# Patient Record
Sex: MALE | Race: WHITE | NOT HISPANIC OR LATINO | Employment: OTHER | URBAN - METROPOLITAN AREA
[De-identification: names, ages, dates, MRNs, and addresses within clinical notes are randomized per-mention and may not be internally consistent; named-entity substitution may affect disease eponyms.]

---

## 2022-06-14 NOTE — PROGRESS NOTES
6/15/2022      Chief Complaint   Patient presents with    Flank Pain     Right side          Assessment and Plan    32 y o  male -- New patient    1  Nephrolithiasis  2  Right sided Flank pain  - UA showing blood, negative for infection  - non-obstructing stones seen on US (5/19/22)  - Having flank pain  - Will obtain STAT CT and call with results  - Flomax sent to pharmacy  Increase fluids  - ER precautions of severe pain, fever, chills, nausea, vomiting  - Call with any questions or concerns  - All questions answered; patient understands and agrees with plan        History of Present Illness  Messi Escalera IV is a 32 y o  male new patient here for evaluation of nephrolithiasis  Patient has a history of nephrolithiasis  Patient states he has been having right sided flank pain for the past few hours  Denies recent stone passage  Denies needing surgical intervention in the past for kidney stones  Denies gross hematuria, dysuria, fever, chills, nausea  Patient did vomit 2 times this morning, however, feels better now Patient did have US performed (5/19/22) at an outside facility showing intrarenal, non-obstructing stones  Denies seeing urology in the past      Review of Systems   Constitutional: Negative for activity change, appetite change, chills and fever  HENT: Negative for congestion and trouble swallowing  Respiratory: Negative for cough and shortness of breath  Cardiovascular: Negative for chest pain, palpitations and leg swelling  Gastrointestinal: Negative for abdominal pain, constipation, diarrhea, nausea and vomiting  Genitourinary: Positive for flank pain  Negative for difficulty urinating, dysuria, frequency, hematuria and urgency  Musculoskeletal: Negative for back pain and gait problem  Skin: Negative for wound  Allergic/Immunologic: Negative for immunocompromised state  Neurological: Negative for dizziness and syncope  Hematological: Does not bruise/bleed easily  Psychiatric/Behavioral: Negative for confusion  All other systems reviewed and are negative  Vitals  Vitals:    06/15/22 1422   BP: 124/72   Pulse: 92   SpO2: 98%   Weight: 112 kg (248 lb)   Height: 5' 5" (1 651 m)       Physical Exam  Constitutional:       General: He is not in acute distress  Appearance: Normal appearance  He is not ill-appearing, toxic-appearing or diaphoretic  HENT:      Head: Normocephalic  Nose: No congestion  Eyes:      General: No scleral icterus  Right eye: No discharge  Left eye: No discharge  Conjunctiva/sclera: Conjunctivae normal       Pupils: Pupils are equal, round, and reactive to light  Pulmonary:      Effort: Pulmonary effort is normal    Musculoskeletal:      Cervical back: Normal range of motion  Skin:     General: Skin is warm and dry  Coloration: Skin is not jaundiced or pale  Findings: No bruising, erythema, lesion or rash  Neurological:      General: No focal deficit present  Mental Status: He is alert and oriented to person, place, and time  Mental status is at baseline  Gait: Gait normal    Psychiatric:         Mood and Affect: Mood normal          Behavior: Behavior normal          Thought Content: Thought content normal          Judgment: Judgment normal            Past History  Past Medical History:   Diagnosis Date    ADHD     Autism     Fatty liver     Renal calculi      Social History     Socioeconomic History    Marital status: Single     Spouse name: None    Number of children: None    Years of education: None    Highest education level: None   Occupational History    None   Tobacco Use    Smoking status: Never Smoker    Smokeless tobacco: Never Used   Vaping Use    Vaping Use: Never used   Substance and Sexual Activity    Alcohol use: Yes     Comment: Occ      Drug use: Never    Sexual activity: None   Other Topics Concern    None   Social History Narrative    None     Social Determinants of Health     Financial Resource Strain: Not on file   Food Insecurity: Not on file   Transportation Needs: Not on file   Physical Activity: Not on file   Stress: Not on file   Social Connections: Not on file   Intimate Partner Violence: Not on file   Housing Stability: Not on file     Social History     Tobacco Use   Smoking Status Never Smoker   Smokeless Tobacco Never Used     Family History   Problem Relation Age of Onset    Heart disease Father     Hypertension Mother     Anxiety disorder Mother     Thyroid disease Mother     No Known Problems Paternal Grandmother     Asthma Sister     Asthma Sister     ADD / ADHD Sister     Bipolar disorder Sister     No Known Problems Sister        The following portions of the patient's history were reviewed and updated as appropriate: allergies, current medications, past medical history, past social history, past surgical history and problem list     Results  Recent Results (from the past 1 hour(s))   POCT urine dip    Collection Time: 06/15/22  2:37 PM   Result Value Ref Range    LEUKOCYTE ESTERASE,UA -     NITRITE,UA -     SL AMB POCT UROBILINOGEN 0 2     POCT URINE PROTEIN +      PH,UA 6 5     BLOOD,UA +++     SPECIFIC GRAVITY,UA 1 020     KETONES,UA -     BILIRUBIN,UA -     GLUCOSE, UA -      COLOR,UA dark nikolai     CLARITY,UA cloudy    ]  No results found for: PSA  No results found for: GLUCOSE, CALCIUM, NA, K, CO2, CL, BUN, CREATININE  No results found for: WBC, HGB, HCT, MCV, PLT    Cole Verde PA-C

## 2022-06-15 ENCOUNTER — HOSPITAL ENCOUNTER (OUTPATIENT)
Dept: CT IMAGING | Facility: HOSPITAL | Age: 27
Discharge: HOME | End: 2022-06-15
Payer: COMMERCIAL

## 2022-06-15 ENCOUNTER — OFFICE VISIT (OUTPATIENT)
Dept: UROLOGY | Facility: CLINIC | Age: 27
End: 2022-06-15
Payer: COMMERCIAL

## 2022-06-15 VITALS
SYSTOLIC BLOOD PRESSURE: 124 MMHG | OXYGEN SATURATION: 98 % | HEIGHT: 65 IN | DIASTOLIC BLOOD PRESSURE: 72 MMHG | HEART RATE: 92 BPM | WEIGHT: 248 LBS | BODY MASS INDEX: 41.32 KG/M2

## 2022-06-15 DIAGNOSIS — R10.9 FLANK PAIN: Primary | ICD-10-CM

## 2022-06-15 DIAGNOSIS — N20.0 RENAL CALCULI: ICD-10-CM

## 2022-06-15 LAB
SL AMB  POCT GLUCOSE, UA: NORMAL
SL AMB LEUKOCYTE ESTERASE,UA: NORMAL
SL AMB POCT BILIRUBIN,UA: NORMAL
SL AMB POCT BLOOD,UA: NORMAL
SL AMB POCT CLARITY,UA: NORMAL
SL AMB POCT COLOR,UA: NORMAL
SL AMB POCT KETONES,UA: NORMAL
SL AMB POCT NITRITE,UA: NORMAL
SL AMB POCT PH,UA: 6.5
SL AMB POCT SPECIFIC GRAVITY,UA: 1.02
SL AMB POCT URINE PROTEIN: NORMAL
SL AMB POCT UROBILINOGEN: 0.2

## 2022-06-15 PROCEDURE — 74176 CT ABD & PELVIS W/O CONTRAST: CPT

## 2022-06-15 PROCEDURE — 99204 OFFICE O/P NEW MOD 45 MIN: CPT | Performed by: PHYSICIAN ASSISTANT

## 2022-06-15 PROCEDURE — 81002 URINALYSIS NONAUTO W/O SCOPE: CPT | Performed by: PHYSICIAN ASSISTANT

## 2022-06-15 RX ORDER — PAROXETINE HYDROCHLORIDE 40 MG/1
40 TABLET, FILM COATED ORAL EVERY MORNING
COMMUNITY
Start: 2022-06-07

## 2022-06-15 RX ORDER — LORATADINE 10 MG/1
10 TABLET ORAL DAILY
COMMUNITY
Start: 2022-05-23

## 2022-06-15 RX ORDER — TOPIRAMATE 100 MG/1
100 TABLET, FILM COATED ORAL
COMMUNITY
Start: 2022-05-16

## 2022-06-15 RX ORDER — DEXTROAMPHETAMINE SACCHARATE, AMPHETAMINE ASPARTATE MONOHYDRATE, DEXTROAMPHETAMINE SULFATE AND AMPHETAMINE SULFATE 5; 5; 5; 5 MG/1; MG/1; MG/1; MG/1
20 CAPSULE, EXTENDED RELEASE ORAL EVERY MORNING
COMMUNITY
Start: 2022-06-07

## 2022-06-15 RX ORDER — FLUTICASONE PROPIONATE 50 MCG
SPRAY, SUSPENSION (ML) NASAL
COMMUNITY
Start: 2022-06-04

## 2022-06-15 RX ORDER — LORAZEPAM 0.5 MG/1
0.5 TABLET ORAL 2 TIMES DAILY PRN
COMMUNITY
Start: 2022-05-23

## 2022-06-15 RX ORDER — ARIPIPRAZOLE 5 MG/1
5 TABLET ORAL DAILY
COMMUNITY
Start: 2022-06-03

## 2022-06-15 RX ORDER — TRAZODONE HYDROCHLORIDE 150 MG/1
150 TABLET ORAL
COMMUNITY
Start: 2022-05-16

## 2022-06-15 RX ORDER — CLONIDINE HYDROCHLORIDE 0.1 MG/1
0.1 TABLET ORAL 2 TIMES DAILY
COMMUNITY
Start: 2022-04-21

## 2022-06-15 RX ORDER — TAMSULOSIN HYDROCHLORIDE 0.4 MG/1
0.4 CAPSULE ORAL
Qty: 30 CAPSULE | Refills: 0 | Status: SHIPPED | OUTPATIENT
Start: 2022-06-15 | End: 2022-07-08

## 2022-06-15 RX ORDER — DEXTROAMPHETAMINE SACCHARATE, AMPHETAMINE ASPARTATE, DEXTROAMPHETAMINE SULFATE AND AMPHETAMINE SULFATE 2.5; 2.5; 2.5; 2.5 MG/1; MG/1; MG/1; MG/1
TABLET ORAL
COMMUNITY
Start: 2022-05-20

## 2022-06-16 ENCOUNTER — TELEPHONE (OUTPATIENT)
Dept: UROLOGY | Facility: CLINIC | Age: 27
End: 2022-06-16

## 2022-06-16 DIAGNOSIS — N20.0 NEPHROLITHIASIS: Primary | ICD-10-CM

## 2022-06-16 NOTE — TELEPHONE ENCOUNTER
As long as patient is not having any visible blood in his urine I would just recommend repeating urine testing/UA micro in a couple weeks

## 2022-06-16 NOTE — TELEPHONE ENCOUNTER
Marty Hamman, she wants to know why he has blood in his urine? Mother was given the results of CT  Please advise

## 2022-06-16 NOTE — TELEPHONE ENCOUNTER
----- Message from Marquez Jensen PA-C sent at 6/16/2022  9:54 AM EDT -----  Please let patient and POA know that his CT scan from yesterday showing nonobstructing intrarenal stones without signs of obstructing stones or blockage  No indication for surgical intervention at this time  Patient can stop taking Flomax  Patient should follow-up in 1 year for symptom reassessment    Thank you

## 2022-06-16 NOTE — TELEPHONE ENCOUNTER
Spoke with Aby Rodrigez, patients Mother, gave results  She will have patient stop Flomax and knows to call PCP if patient develops further issues

## 2022-06-16 NOTE — TELEPHONE ENCOUNTER
Mom stated she still has questions regarding her son having blood in his urine and would like to discuss

## 2022-06-17 NOTE — TELEPHONE ENCOUNTER
Attempted to contact patient mother  Patient states that he has otc and has a whole ritual that he does when going to the bathroom  Patient mother will check his urine on next visit  Reviewed providers instructions   Will contact our office with any changes  Reviewed Ed precautions for pain  Nausea, vomiting, fever or difficulty with urination  Will get urine testing as advised   Patients mother verbalized understanding and agreement

## 2022-06-17 NOTE — TELEPHONE ENCOUNTER
Left message with provider's recommendations on mother's voicemail  Advised her to call back with any additional questions

## 2022-06-17 NOTE — TELEPHONE ENCOUNTER
Patient's mom is returning a call from Zbigniew Downey regarding her sons test results  Please return her call

## 2022-07-08 DIAGNOSIS — R10.9 FLANK PAIN: ICD-10-CM

## 2022-07-08 RX ORDER — TAMSULOSIN HYDROCHLORIDE 0.4 MG/1
CAPSULE ORAL
Qty: 90 CAPSULE | Refills: 1 | Status: SHIPPED | OUTPATIENT
Start: 2022-07-08